# Patient Record
Sex: MALE | Race: WHITE | NOT HISPANIC OR LATINO | Employment: STUDENT | URBAN - METROPOLITAN AREA
[De-identification: names, ages, dates, MRNs, and addresses within clinical notes are randomized per-mention and may not be internally consistent; named-entity substitution may affect disease eponyms.]

---
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
## 2025-01-13 ENCOUNTER — TELEPHONE (OUTPATIENT)
Age: 20
End: 2025-01-13

## 2025-01-13 DIAGNOSIS — R68.81 EARLY SATIETY: ICD-10-CM

## 2025-01-13 NOTE — TELEPHONE ENCOUNTER
Mom is calling to inform office that patient just informed her for the last 2 weeks his stools have been very watery and has been bothering him.     Mom states she was told to inform office is this happens again so they can be put back on Cyproheptadine.     Best number to call mom would be [phone]

## 2025-01-14 NOTE — TELEPHONE ENCOUNTER
Called mom-  Mom states that Elian has been completely weaned off Cyproheptadine for about three weeks now. States that Elian informed her last night that he has been having watery stool for the past two weeks. Mom denies any nausea or vomiting. Denies any fevers or sick contacts. States that he has been eating and drinking without issue.    Elian has appt with Leigh Ann on 1/21.     Will touch base with Leigh Ann and call mom back with plan.

## 2025-01-15 RX ORDER — CYPROHEPTADINE HYDROCHLORIDE 4 MG/1
8 TABLET ORAL
Qty: 60 TABLET | Refills: 3 | Status: SHIPPED | OUTPATIENT
Start: 2025-01-15 | End: 2025-01-15 | Stop reason: SDUPTHER

## 2025-01-15 RX ORDER — CYPROHEPTADINE HYDROCHLORIDE 4 MG/1
8 TABLET ORAL
Qty: 60 TABLET | Refills: 3 | Status: SHIPPED | OUTPATIENT
Start: 2025-01-15

## 2025-01-15 NOTE — TELEPHONE ENCOUNTER
Spoke with Mom, aware of cyproheptadine. Medication needs to be sent to [facility] mail order. Can you cancel this script out and resend to mail order pharmacy. Thank you!

## 2025-02-14 ENCOUNTER — OFFICE VISIT (OUTPATIENT)
Dept: GASTROENTEROLOGY | Facility: CLINIC | Age: 20
End: 2025-02-14
Payer: COMMERCIAL

## 2025-02-14 VITALS — BODY MASS INDEX: 24.36 KG/M2 | WEIGHT: 155.2 LBS | HEIGHT: 67 IN

## 2025-02-14 DIAGNOSIS — R68.81 EARLY SATIETY: ICD-10-CM

## 2025-02-14 DIAGNOSIS — R63.0 POOR APPETITE: ICD-10-CM

## 2025-02-14 DIAGNOSIS — K30 PEPTIC DISEASE: ICD-10-CM

## 2025-02-14 DIAGNOSIS — R10.13 DYSPEPSIA: Primary | ICD-10-CM

## 2025-02-14 PROCEDURE — 99214 OFFICE O/P EST MOD 30 MIN: CPT | Performed by: NURSE PRACTITIONER

## 2025-02-14 RX ORDER — FAMOTIDINE 40 MG/1
40 TABLET, FILM COATED ORAL 2 TIMES DAILY
Qty: 180 TABLET | Refills: 1 | Status: SHIPPED | OUTPATIENT
Start: 2025-02-14

## 2025-02-14 RX ORDER — CYPROHEPTADINE HYDROCHLORIDE 4 MG/1
TABLET ORAL
Qty: 60 TABLET | Refills: 3 | Status: SHIPPED | OUTPATIENT
Start: 2025-02-14

## 2025-02-14 NOTE — PROGRESS NOTES
Name: Elian Askew      : 2005      MRN: [id number]  Encounter Provider: NORA Iverson  Encounter Date: 2025   Encounter department: [facility]  :  Assessment & Plan  Dyspepsia  Elian has a history of heartburn and abdominal pain managed with famotidine.  He redevelops dyspepsia symptoms if he misses taking the medication.  EGD/colonoscopy on 2024 unremarkable.     Remain on famotidine 1 tablet (40mg) twice daily  Transition to adult GI    Orders:    Ambulatory Referral to Gastroenterology; Future    Poor appetite  Elian has a history of reduction in appetite with subsequent slow weight gain.  He has an overall improvement in his appetite with cyproheptadine.  He was not able to tolerate a trial of coming off of the cyproheptadine and developed diarrheal stools.  He restarted the cyproheptadine (1 tablet once daily instead of 2 tablets daily) and his Bm's are back to being formed.    Remain on cyproheptadine 1 tablet (4mg) once daily at bedtime  Continue to eat 3 meals per day       Early satiety    Orders:    cyproheptadine (PERIACTIN) 4 mg tablet; Take 1 tablet (4mg) once daily at bedtime    Peptic disease    Orders:    famotidine (PEPCID) 40 MG tablet; Take 1 tablet (40 mg total) by mouth 2 (two) times a day        History of Present Illness   HPI  Elian Askew is a 19 y.o. male with heartburn, abdominal pain and constipation.  He is accompanied by his mother.     His chart was reviewed.     Prior studies:  Upper endoscopy and colonoscopy with Dr. Aburto on 2024.  Macroscopic:  The esophagus, antrum and duodenum appeared normal.  Erythematous mucosa in the fundus of the stomach  The cecum, ascending colon, transverse colon, descending colon and rectosigmoid appeared normal.  Erythematous mucosa in the terminal ileum  Histology: Unremarkable     2024 Screening blood studies:  Celiac, CBC, CMP and CRP unremarkable  ESR elevated  "28        Today the family reports the following:    Bm's looser when tried to discontinue the cyproheptadine  No abdominal pain  Off cyproheptadine for 1-2 weeks then restarted cyproheptadine (1 tablet once daily instead of 2 tablets daily) and stools returned to normal consistency and formed    Dyspepsia symptoms well managed with famotidine    Appetite at baseline  Eating 3 meals per day    No nausea, vomiting or dysphagia    Family history of dyspepsia in both mother and father    History obtained from: patient and patient's mother    Review of Systems   All other systems reviewed and are negative.    Pertinent Medical History        Current Outpatient Medications on File Prior to Visit   Medication Sig Dispense Refill    acetaminophen-codeine (TYLENOL with CODEINE #3) 300-30 MG per tablet Take 1 tablet by mouth every 4 to 6 hours if needed for pain      albuterol (PROVENTIL HFA,VENTOLIN HFA) 90 mcg/act inhaler Inhale 2 puffs every 6 (six) hours as needed      Biotin 10 MG CAPS Take by mouth in the morning      fluticasone (FLONASE) 50 mcg/act nasal spray 1 spray into each nostril daily 9.9 mL 0    ketoconazole (NIZORAL) 2 % shampoo Daily for 2 weeks straight and then on \"Mondays, Wednesdays and Fridays\": Lather into scalp ; leave on for 5 minutes and then rinse off completely. 100 mL 6    Minoxidil-Tretinoin 7-0.025 % SOLN Finasteride 0.1%/Minoxidil 7%/Tretinoin 0.025% Foam ID [id number] 60 mL 4    Multiple Vitamin (multivitamin) capsule Take 1 capsule by mouth daily      sertraline (ZOLOFT) 50 mg tablet TAKE ONE TABLET BY MOUTH EVERY DAY 90 tablet 1    [DISCONTINUED] cyproheptadine (PERIACTIN) 4 mg tablet Take 2 tablets (8 mg total) by mouth daily at bedtime 60 tablet 3    [DISCONTINUED] famotidine (PEPCID) 40 MG tablet Take 1 tablet (40 mg total) by mouth 2 (two) times a day 180 tablet 1    amoxicillin-clavulanate (AUGMENTIN) 875-125 mg per tablet take 1 tablet by mouth twice a day for 7 days (Patient not taking: " "Reported on 2/14/2025)      chlorhexidine (PERIDEX) 0.12 % solution RINSE 2 TIMES A DAY FOR 2 WEEKS. (Patient not taking: Reported on 2/14/2025)      fluticasone (FLONASE) 50 mcg/act nasal spray 2 sprays into each nostril daily (Patient not taking: Reported on 2/14/2025) 16 g 1    hydrOXYzine pamoate (VISTARIL) 25 mg capsule Take 1 capsule (25 mg total) by mouth 2 (two) times a day as needed for anxiety (Patient not taking: Reported on 2/14/2025) 60 capsule 0     No current facility-administered medications on file prior to visit.         Objective   Ht 5' 7\" (1.702 m)   Wt 70.4 kg (155 lb 3.3 oz)   BMI 24.31 kg/m²      Physical Exam  Vitals and nursing note reviewed.   Constitutional:       General: He is not in acute distress.     Appearance: He is well-developed.   HENT:      Head: Normocephalic and atraumatic.   Eyes:      Conjunctiva/sclera: Conjunctivae normal.   Cardiovascular:      Rate and Rhythm: Normal rate and regular rhythm.      Heart sounds: No murmur heard.  Pulmonary:      Effort: Pulmonary effort is normal. No respiratory distress.      Breath sounds: Normal breath sounds.   Abdominal:      Palpations: Abdomen is soft.      Tenderness: There is no abdominal tenderness.   Musculoskeletal:         General: No swelling.      Cervical back: Neck supple.   Skin:     General: Skin is warm and dry.      Capillary Refill: Capillary refill takes less than 2 seconds.   Neurological:      Mental Status: He is alert.   Psychiatric:         Mood and Affect: Mood normal.         Administrative Statements   I have spent a total time of 30 minutes in caring for this patient on the day of the visit/encounter including Instructions for management, Patient and family education, Importance of tx compliance, Impressions, Documenting in the medical record, and Obtaining or reviewing history  .   "

## 2025-02-14 NOTE — PATIENT INSTRUCTIONS
It was a pleasure seeing you today!    The following is a summary of what was discussed:    Remain on cyproheptadine 1 tablet (4mg) once daily    Remain on famotidine 1 tablet (40mg) twice daily    Transition to adult GI:  [phone]

## 2025-04-17 ENCOUNTER — CONSULT (OUTPATIENT)
Age: 20
End: 2025-04-17
Payer: COMMERCIAL

## 2025-04-17 VITALS
BODY MASS INDEX: 23.6 KG/M2 | HEIGHT: 68 IN | OXYGEN SATURATION: 98 % | TEMPERATURE: 96.5 F | DIASTOLIC BLOOD PRESSURE: 64 MMHG | SYSTOLIC BLOOD PRESSURE: 126 MMHG

## 2025-04-17 DIAGNOSIS — K21.9 GASTROESOPHAGEAL REFLUX DISEASE WITHOUT ESOPHAGITIS: ICD-10-CM

## 2025-04-17 DIAGNOSIS — R10.13 DYSPEPSIA: Primary | ICD-10-CM

## 2025-04-17 DIAGNOSIS — K30 PEPTIC DISEASE: ICD-10-CM

## 2025-04-17 DIAGNOSIS — R68.81 EARLY SATIETY: ICD-10-CM

## 2025-04-17 PROCEDURE — 99243 OFF/OP CNSLTJ NEW/EST LOW 30: CPT | Performed by: PHYSICIAN ASSISTANT

## 2025-04-17 RX ORDER — FAMOTIDINE 40 MG/1
40 TABLET, FILM COATED ORAL 2 TIMES DAILY
Qty: 180 TABLET | Refills: 3 | Status: SHIPPED | OUTPATIENT
Start: 2025-04-17

## 2025-04-17 RX ORDER — CYPROHEPTADINE HYDROCHLORIDE 4 MG/1
TABLET ORAL
Qty: 90 TABLET | Refills: 3 | Status: SHIPPED | OUTPATIENT
Start: 2025-04-17

## 2025-04-17 NOTE — ASSESSMENT & PLAN NOTE
- Patient's GERD currently stable on famotidine 40 mg twice daily.   No current daytime or night time break through symptoms.  No pain or difficulty swallowing.  No unintentional weight loss.  - He also takes cyproheptadine 4 mg in the morning

## 2025-04-17 NOTE — PROGRESS NOTES
Name: Elian Askew      : 2005      MRN: [id number]  Encounter Provider: Mariusz Murray PA-C  Encounter Date: 2025   Encounter department: [facility]  :  Assessment & Plan  Dyspepsia  - Continue famotidine 40 mg twice daily  - Continue cyproheptadine 4 mg tablets in the morning    Orders:  •  Ambulatory Referral to Gastroenterology    Gastroesophageal reflux disease without esophagitis  - Patient's GERD currently stable on famotidine 40 mg twice daily.   No current daytime or night time break through symptoms.  No pain or difficulty swallowing.  No unintentional weight loss.  - He also takes cyproheptadine 4 mg in the morning         Peptic disease  - No current symptoms  Orders:  •  famotidine (PEPCID) 40 MG tablet; Take 1 tablet (40 mg total) by mouth 2 (two) times a day    Early satiety  - No current symptoms  Orders:  •  cyproheptadine (PERIACTIN) 4 mg tablet; Take 1 tablet (4mg) once daily at bedtime        History of Present Illness   Elian Askew is a 19 y.o. male with past medical history of mild intermittent asthma, GERD and generalized anxiety who presents in consult for dyspepsia.  He is currently on famotidine 40 mg twice daily.    He was seen recently by his primary care on 2025 at which time he was complaining of heartburn abdominal pain and constipation.  He has had recent workup by pediatric gastroenterology Dr. Aburto with EGD and colonoscopy in May 2024.  EGD showed some gastritis, showed some erythema in the terminal ileum.  Pathology were negative.  Gastric biopsy negative for H. pylori and gastric intestinal metaplasia.  Duodenal biopsies negative for celiac disease.  Mid and distal esophageal biopsies negative for any eosinophilic process and Decker's esophagus.  Terminal ileal biopsy showed mild focal acute ileitis.  There was some focal cryptitis and neutrophils in the lamina propria but the changes are  nonspecific and there was no evidence of chronic mucosal injury and no granuloma or dysplasia.  Cecal ascending transverse descending and rectosigmoid showed no evidence of colitis and no histopathologic changes.    His last office visit with pediatric gastroenterology appeared to be in July 2023 at which time was reporting heartburn and decreased appetite.  His weight at that time was 127 pounds with BMI 20.16.  He had been on medication cyproheptadine to help encourage his appetite.  Weight at recent office visit February 14, 2025 was 155 pounds with BMI 24.31.  BMI currently 23.60    No recent abdominal imaging.    Patient denies any abdominal pain, nausea/vomiting, pain or difficulty swallowing, change in bowels, black or bloody stools.  He feels well-controlled on his famotidine 40 mg twice daily and cyproheptadine 4 mg in the morning.  If he does not take the medicines he gets reflux symptoms which include epigastric burning.      HPI  History obtained from: patient  Review of Systems   Constitutional:  Negative for activity change, appetite change, chills, diaphoresis, fatigue and unexpected weight change.   HENT:  Negative for mouth sores, rhinorrhea, sore throat and trouble swallowing.    Eyes:  Negative for discharge, redness and visual disturbance.   Respiratory:  Negative for apnea, cough, choking, chest tightness and shortness of breath.    Cardiovascular:  Negative for chest pain, palpitations and leg swelling.   Gastrointestinal:  Negative for abdominal distention, abdominal pain, anal bleeding, blood in stool, constipation, diarrhea, nausea, rectal pain and vomiting.        Reflux and dyspepsia   Genitourinary:  Negative for difficulty urinating, dysuria, frequency and hematuria.   Musculoskeletal:  Negative for arthralgias, back pain, gait problem, joint swelling and myalgias.   Skin:  Negative for color change, pallor and rash.   Allergic/Immunologic: Negative for environmental allergies and food  allergies.   Neurological:  Negative for dizziness, tremors, weakness, light-headedness, numbness and headaches.   Psychiatric/Behavioral:  Negative for agitation, behavioral problems, confusion and sleep disturbance. The patient is not nervous/anxious.     A complete review of systems is negative other than that noted above in the HPI.    Past Medical History   Past Medical History:   Diagnosis Date   • Anxiety    • Asthma    • Depression      Past Surgical History:   Procedure Laterality Date   • BUCCAL MASS EXCISION Right 6/6/2024    Procedure: EXCISION OF RIGHT SUBLINGUAL GLAND AND SIALODOCHOPLASTY OF RIGHT SUBMANDIBULAR DUCT;  Surgeon: Alberto Saini DMD;  Location: [facility];  Service: Maxillofacial   • COLONOSCOPY     • EGD     • WISDOM TOOTH EXTRACTION Bilateral 02/08/2024     Family History   Problem Relation Age of Onset   • No Known Problems Mother    • No Known Problems Father    • No Known Problems Maternal Grandmother    • No Known Problems Maternal Grandfather    • No Known Problems Paternal Grandmother    • No Known Problems Paternal Grandfather       reports that he has never smoked. He has never used smokeless tobacco. He reports that he does not drink alcohol and does not use drugs.  Current Outpatient Medications   Medication Instructions   • acetaminophen-codeine (TYLENOL with CODEINE #3) 300-30 MG per tablet 1 tablet   • albuterol (PROVENTIL HFA,VENTOLIN HFA) 90 mcg/act inhaler 2 puffs, Every 6 hours PRN   • amoxicillin-clavulanate (AUGMENTIN) 875-125 mg per tablet take 1 tablet by mouth twice a day for 7 days   • Biotin 10 MG CAPS Daily   • chlorhexidine (PERIDEX) 0.12 % solution RINSE 2 TIMES A DAY FOR 2 WEEKS.   • cyproheptadine (PERIACTIN) 4 mg tablet Take 1 tablet (4mg) once daily at bedtime   • famotidine (PEPCID) 40 mg, Oral, 2 times daily   • fluticasone (FLONASE) 50 mcg/act nasal spray 2 sprays, Nasal, Daily   • fluticasone (FLONASE) 50 mcg/act nasal spray 1 spray, Nasal,  "Daily   • hydrOXYzine pamoate (VISTARIL) 25 mg, Oral, 2 times daily PRN   • ketoconazole (NIZORAL) 2 % shampoo Daily for 2 weeks straight and then on \"Mondays, Wednesdays and Fridays\": Lather into scalp ; leave on for 5 minutes and then rinse off completely.   • Minoxidil-Tretinoin 7-0.025 % SOLN Finasteride 0.1%/Minoxidil 7%/Tretinoin 0.025% Foam ID [id number]   • Multiple Vitamin (multivitamin) capsule 1 capsule, Daily   • sertraline (ZOLOFT) 50 mg, Oral, Daily   No Known Allergies   Current Outpatient Medications on File Prior to Visit   Medication Sig Dispense Refill   • albuterol (PROVENTIL HFA,VENTOLIN HFA) 90 mcg/act inhaler Inhale 2 puffs every 6 (six) hours as needed     • Biotin 10 MG CAPS Take by mouth in the morning     • fluticasone (FLONASE) 50 mcg/act nasal spray 1 spray into each nostril daily 9.9 mL 0   • ketoconazole (NIZORAL) 2 % shampoo Daily for 2 weeks straight and then on \"Mondays, Wednesdays and Fridays\": Lather into scalp ; leave on for 5 minutes and then rinse off completely. 100 mL 6   • Minoxidil-Tretinoin 7-0.025 % SOLN Finasteride 0.1%/Minoxidil 7%/Tretinoin 0.025% Foam ID [id number] 60 mL 4   • Multiple Vitamin (multivitamin) capsule Take 1 capsule by mouth daily     • sertraline (ZOLOFT) 50 mg tablet TAKE ONE TABLET BY MOUTH EVERY DAY 90 tablet 1   • [DISCONTINUED] cyproheptadine (PERIACTIN) 4 mg tablet Take 1 tablet (4mg) once daily at bedtime 60 tablet 3   • [DISCONTINUED] famotidine (PEPCID) 40 MG tablet Take 1 tablet (40 mg total) by mouth 2 (two) times a day 180 tablet 1   • acetaminophen-codeine (TYLENOL with CODEINE #3) 300-30 MG per tablet Take 1 tablet by mouth every 4 to 6 hours if needed for pain (Patient not taking: Reported on 4/17/2025)     • amoxicillin-clavulanate (AUGMENTIN) 875-125 mg per tablet take 1 tablet by mouth twice a day for 7 days (Patient not taking: Reported on 2/14/2025)     • chlorhexidine (PERIDEX) 0.12 % solution RINSE 2 TIMES A DAY FOR 2 WEEKS. (Patient not " "taking: Reported on 2/14/2025)     • fluticasone (FLONASE) 50 mcg/act nasal spray 2 sprays into each nostril daily (Patient not taking: Reported on 2/14/2025) 16 g 1   • hydrOXYzine pamoate (VISTARIL) 25 mg capsule Take 1 capsule (25 mg total) by mouth 2 (two) times a day as needed for anxiety (Patient not taking: Reported on 2/14/2025) 60 capsule 0     No current facility-administered medications on file prior to visit.      Social History     Tobacco Use   • Smoking status: Never   • Smokeless tobacco: Never   Vaping Use   • Vaping status: Never Used   Substance and Sexual Activity   • Alcohol use: Never   • Drug use: Never   • Sexual activity: Not on file        Objective   /64 (BP Location: Left arm)   Temp (!) 96.5 °F (35.8 °C)   Ht 5' 8\" (1.727 m)   SpO2 98%   BMI 23.60 kg/m²     Physical Exam  Constitutional:       General: He is not in acute distress.     Appearance: Normal appearance. He is not ill-appearing.   HENT:      Head: Normocephalic and atraumatic.   Eyes:      General: No scleral icterus.     Conjunctiva/sclera: Conjunctivae normal.   Cardiovascular:      Rate and Rhythm: Normal rate and regular rhythm.   Pulmonary:      Effort: Pulmonary effort is normal. No respiratory distress.      Breath sounds: Normal breath sounds.   Abdominal:      General: Bowel sounds are normal. There is no distension.      Palpations: Abdomen is soft.      Tenderness: There is no abdominal tenderness. There is no guarding.   Skin:     General: Skin is warm and dry.   Neurological:      Mental Status: He is alert and oriented to person, place, and time.   Psychiatric:         Mood and Affect: Mood normal.         Behavior: Behavior normal.            Lab Results: I personally reviewed relevant lab results.       Results for orders placed during the hospital encounter of 05/13/24    EGD    Impression  Performed forceps biopsies in the upper third of the esophagus, lower third of the esophagus, body of the " stomach, antrum, duodenal bulb and 2nd part of the duodenum  The esophagus, antrum and duodenum appeared normal.  Erythematous mucosa in the fundus of the stomach      RECOMMENDATION:  Await pathology results  Follow up with GI Clinic            MD Mariusz June PA-C  [facility] - Gastroentrology

## 2025-06-03 ENCOUNTER — OFFICE VISIT (OUTPATIENT)
Age: 20
End: 2025-06-03
Payer: COMMERCIAL

## 2025-06-03 VITALS — WEIGHT: 155 LBS | BODY MASS INDEX: 23.57 KG/M2

## 2025-06-03 DIAGNOSIS — D22.4 MELANOCYTIC NEVUS OF NECK: ICD-10-CM

## 2025-06-03 DIAGNOSIS — L64.9 ANDROGENETIC ALOPECIA: ICD-10-CM

## 2025-06-03 DIAGNOSIS — L21.9 SEBORRHEIC DERMATITIS: Primary | ICD-10-CM

## 2025-06-03 PROCEDURE — 99214 OFFICE O/P EST MOD 30 MIN: CPT

## 2025-06-03 RX ORDER — MINOXIDIL 2.5 MG/1
TABLET ORAL
Qty: 15 TABLET | Refills: 5 | Status: SHIPPED | OUTPATIENT
Start: 2025-06-03

## 2025-06-03 NOTE — PATIENT INSTRUCTIONS
Reccommended to patient to start head and shoulders instead to the ketoconazole shampoo every other month  Reccommended patient to apply regular shampoo and conditioner   Stop apply the Compounded topical Finasteride 0.1%/Minoxidil 7%/Tretinoin 0.025% topical foam sent to [place] [facility]   Start oral medication Minoxidil 2.5 mg every other day  6 month follow up

## 2025-06-03 NOTE — PROGRESS NOTES
"[facility] Dermatology Clinic Note     Patient Name: Elian Askew  Encounter Date: 6/3/25       Have you been cared for by a [facility] Dermatologist in the last 3 years and, if so, which description applies to you? Yes. I have been here within the last 3 years, and my medical history has NOT changed since that time. I am not of child-bearing potential.     REVIEW OF SYSTEMS:  Have you recently had or currently have any of the following? No changes in my recent health.   PAST MEDICAL HISTORY:  Have you personally ever had or currently have any of the following?  If \"YES,\" then please provide more detail. No changes in my medical history.   HISTORY OF IMMUNOSUPPRESSION: Do you have a history of any of the following:  Systemic Immunosuppression such as Diabetes, Biologic or Immunotherapy, Chemotherapy, Organ Transplantation, Bone Marrow Transplantation or Prednisone?  No     Answering \"YES\" requires the addition of the dotphrase \"IMMUNOSUPPRESSED\" as the first diagnosis of the patient's visit.   FAMILY HISTORY:  Any \"first degree relatives\" (parent, brother, sister, or child) with the following?    No changes in my family's known health.   PATIENT EXPERIENCE:    Do you want the Dermatologist to perform a COMPLETE skin exam today including a clinical examination under the \"bra and underwear\" areas?  Yes  If necessary, do we have your permission to call and leave a detailed message on your Preferred Phone number that includes your specific medical information?  Yes      Allergies[1] Current Medications[2]              Whom besides the patient is providing clinical information about today's encounter?   NO ADDITIONAL HISTORIAN (patient alone provided history)    Physical Exam and Assessment/Plan by Diagnosis:    ANDROGENETIC ALOPECIA HAIR LOSS     Physical Exam:  Anatomic Location Affected:  scalp  Morphological Description:  thinning frontal scalp, increased variability  Pertinent Negatives: NEGATIVE Hair Pull test   "   Additional History of Present Condition:  Patient last seen on 07/18/24 by NORA Bird. He was given topical Finasteride 0.1%/Minoxidil 7%/Tretinoin 0.025% topical foam and states that the foam is not helping and is interesting in taking oral medication at this time. He has not been using the red light laser cap either. Patient is concerned about breakage of his hair and continues hair thinning. Patients father experienced hair loss in his early 20's. Patient has seborrheic dermatitis as well. Patient denies any personal history of cardiac issues. Oral finasteride or oral minoxidil was to be considered if no improvement with topical.      Assessment and Plan:  Based on a thorough discussion of this condition and the management approach to it (including a comprehensive discussion of the known risks, side effects and potential benefits of treatment), the patient (family) agrees to implement the following specific plan:     Stop compounded topical Finasteride 0.1%/Minoxidil 7%/Tretinoin 0.025% topical foam from [facility].   Discussed PO finasteride 1 mg daily - discussed that persistent sexual dysfunction has been seen in ~1% of men who take finasteride for hair loss.  Discussed PO minoxidil 2.5 mg every other day which has been shown to improve hair growth at low dose. Educated on side effects, including hypertrichosis, hypotension, lower extremity edema. Patient elected to try.   Patient elected to proceed with oral minoxidil. Prescription sent in. Advised if patient has any further questions. He can call the office or send a [employer] message.   Follow-up: 6 months with Dr. Gonzales.     SEBORRHEIC DERMATITIS    Physical Exam:  Anatomic Location Affected:  scalp  Morphological Description:  mild flaking     Additional History of Present Condition:  Patient states he has been using the ketoconazole 2% shampoo but still gets flaking and itching though it has improved. He thinks the ketoconazole shampoo  "may be drying out his hair.     Assessment and Plan:  Based on a thorough discussion of this condition and the management approach to it (including a comprehensive discussion of the known risks, side effects and potential benefits of treatment), the patient (family) agrees to implement the following specific plan:  Can alternate between ketoconazole 2% shampoo, Head and Shoulders Anti Dandruff Shampoo and Selsun Garfield every other month. Lather into scalp and allow to sit for 5-10 min then rinse. Please apply three times a week (Monday, Wednesday, Friday).   Advised patient can apply his regular shampoo and conditioner after.   6 month follow up.     MELANOCYTIC NEVI (\"Moles\")    Physical Exam:  Anatomic Location Affected:   posterior neck   Morphological Description:  central brown papule with surrounding light brown macular component  (fried egg appearance)     Additional History of Present Condition:  Patient states there is one spot behind neck that mom is concerned about the color. Denies any pain, itch, bleeding. Present for years. Denies actively changing or growing moles.     Assessment and Plan:  Based on a thorough discussion of this condition and the management approach to it (including a comprehensive discussion of the known risks, side effects and potential benefits of treatment), the patient (family) agrees to implement the following specific plan:  Reassured benign.   Monitor for changes. ABCDE's of melanoma handout provided.   Practice sun protection. Apply broad spectrum (UVA and UVB) sunscreen, SPF 30 or higher every 2 hours. Wear sun protective clothing, hats, and sunglasses.         Scribe Attestation      I,:  Natasha Villasenor MA am acting as a scribe while in the presence of the attending physician.:       I,:  Radha Styles PA-C personally performed the services described in this documentation    as scribed in my presence.:                  [1] No Known Allergies  [2]   Current Outpatient " "Medications:     acetaminophen-codeine (TYLENOL with CODEINE #3) 300-30 MG per tablet, Take 1 tablet by mouth every 4 to 6 hours if needed for pain (Patient not taking: Reported on 4/17/2025), Disp: , Rfl:     albuterol (PROVENTIL HFA,VENTOLIN HFA) 90 mcg/act inhaler, Inhale 2 puffs every 6 (six) hours as needed, Disp: , Rfl:     amoxicillin-clavulanate (AUGMENTIN) 875-125 mg per tablet, take 1 tablet by mouth twice a day for 7 days (Patient not taking: Reported on 2/14/2025), Disp: , Rfl:     Biotin 10 MG CAPS, Take by mouth in the morning, Disp: , Rfl:     chlorhexidine (PERIDEX) 0.12 % solution, RINSE 2 TIMES A DAY FOR 2 WEEKS. (Patient not taking: Reported on 2/14/2025), Disp: , Rfl:     cyproheptadine (PERIACTIN) 4 mg tablet, Take 1 tablet (4mg) once daily at bedtime, Disp: 90 tablet, Rfl: 3    famotidine (PEPCID) 40 MG tablet, Take 1 tablet (40 mg total) by mouth 2 (two) times a day, Disp: 180 tablet, Rfl: 3    fluticasone (FLONASE) 50 mcg/act nasal spray, 2 sprays into each nostril daily (Patient not taking: Reported on 2/14/2025), Disp: 16 g, Rfl: 1    fluticasone (FLONASE) 50 mcg/act nasal spray, 1 spray into each nostril daily, Disp: 9.9 mL, Rfl: 0    hydrOXYzine pamoate (VISTARIL) 25 mg capsule, Take 1 capsule (25 mg total) by mouth 2 (two) times a day as needed for anxiety (Patient not taking: Reported on 2/14/2025), Disp: 60 capsule, Rfl: 0    ketoconazole (NIZORAL) 2 % shampoo, Daily for 2 weeks straight and then on \"Mondays, Wednesdays and Fridays\": Lather into scalp ; leave on for 5 minutes and then rinse off completely., Disp: 100 mL, Rfl: 6    Minoxidil-Tretinoin 7-0.025 % SOLN, Finasteride 0.1%/Minoxidil 7%/Tretinoin 0.025% Foam ID [id number], Disp: 60 mL, Rfl: 4    Multiple Vitamin (multivitamin) capsule, Take 1 capsule by mouth daily, Disp: , Rfl:     sertraline (ZOLOFT) 50 mg tablet, TAKE ONE TABLET BY MOUTH EVERY DAY, Disp: 90 tablet, Rfl: 1    "

## 2025-06-27 DIAGNOSIS — L64.9 ANDROGENETIC ALOPECIA: ICD-10-CM

## 2025-06-27 RX ORDER — MINOXIDIL 2.5 MG/1
TABLET ORAL
Qty: 45 TABLET | Refills: 0 | Status: SHIPPED | OUTPATIENT
Start: 2025-06-27

## 2025-06-27 NOTE — TELEPHONE ENCOUNTER
Reason for call: Not a duplicate, pharmacy change and patient's mother would like a 90 day supply.   [x] Refill   [] Prior Auth  [] Other:     Office: [facility]   [] PCP/Provider -   [x] Specialty/Provider - Dermatology/ Radha Styles     Medication: minoxidil (LONITEN)     Dose/Frequency: 2.5 mg/ every other day     Quantity: 90 day supply     Pharmacy: [facility] in [facility]   Does the patient have enough for 3 days?   [] Yes   [] No - Send as HP to POD    Mail Away Pharmacy   Does the patient have enough for 10 days?   [x] Yes   [] No - Send as HP to POD

## 2025-07-21 DIAGNOSIS — L21.9 SEBORRHEIC DERMATITIS: ICD-10-CM

## 2025-07-22 NOTE — TELEPHONE ENCOUNTER
Per Rene, please assign medication to proper protocol in order to dispense to patient. Thank you kindly.

## 2025-07-22 NOTE — TELEPHONE ENCOUNTER
Pt was seen 06/03/25    Can alternate between ketoconazole 2% shampoo, Head and Shoulders Anti Dandruff Shampoo and Selsun Garfield every other month. Lather into scalp and allow to sit for 5-10 min then rinse. Please apply three times a week (Monday, Wednesday, Friday).   Advised patient can apply his regular shampoo and conditioner after.   6 month follow up.

## 2025-07-23 RX ORDER — KETOCONAZOLE 20 MG/ML
SHAMPOO, SUSPENSION TOPICAL
Qty: 120 ML | Refills: 0 | Status: SHIPPED | OUTPATIENT
Start: 2025-07-23

## 2025-08-07 ENCOUNTER — OFFICE VISIT (OUTPATIENT)
Dept: FAMILY MEDICINE CLINIC | Facility: CLINIC | Age: 20
End: 2025-08-07
Payer: COMMERCIAL

## 2025-08-07 VITALS
HEIGHT: 68 IN | SYSTOLIC BLOOD PRESSURE: 120 MMHG | TEMPERATURE: 95.1 F | HEART RATE: 60 BPM | DIASTOLIC BLOOD PRESSURE: 78 MMHG | BODY MASS INDEX: 24.89 KG/M2 | OXYGEN SATURATION: 100 % | RESPIRATION RATE: 16 BRPM | WEIGHT: 164.2 LBS

## 2025-08-07 DIAGNOSIS — F43.23 ADJUSTMENT DISORDER WITH MIXED ANXIETY AND DEPRESSED MOOD: ICD-10-CM

## 2025-08-07 DIAGNOSIS — Z00.00 HEALTHCARE MAINTENANCE: Primary | ICD-10-CM

## 2025-08-07 DIAGNOSIS — J30.9 ALLERGIC RHINITIS, UNSPECIFIED SEASONALITY, UNSPECIFIED TRIGGER: ICD-10-CM

## 2025-08-07 DIAGNOSIS — F41.1 GAD (GENERALIZED ANXIETY DISORDER): ICD-10-CM

## 2025-08-07 DIAGNOSIS — J45.20 MILD INTERMITTENT ASTHMA WITHOUT COMPLICATION: ICD-10-CM

## 2025-08-07 PROCEDURE — 99395 PREV VISIT EST AGE 18-39: CPT | Performed by: NURSE PRACTITIONER

## 2025-08-07 RX ORDER — ALBUTEROL SULFATE 90 UG/1
2 INHALANT RESPIRATORY (INHALATION) EVERY 6 HOURS PRN
Qty: 18 G | Refills: 1 | Status: SHIPPED | OUTPATIENT
Start: 2025-08-07

## 2025-08-07 RX ORDER — FLUTICASONE PROPIONATE 50 MCG
2 SPRAY, SUSPENSION (ML) NASAL DAILY
Qty: 16 G | Refills: 1 | Status: SHIPPED | OUTPATIENT
Start: 2025-08-07

## (undated) DEVICE — GLOVE SRG BIOGEL ORTHOPEDIC 8

## (undated) DEVICE — STERILE MANDIBLE PACK: Brand: CARDINAL HEALTH

## (undated) DEVICE — SUT CHROMIC 3-0 PS-2 27 1638H

## (undated) DEVICE — GLOVE INDICATOR PI UNDERGLOVE SZ 8 BLUE

## (undated) DEVICE — ROSEBUD DISSECTORS: Brand: DEROYAL